# Patient Record
Sex: FEMALE | Race: WHITE | NOT HISPANIC OR LATINO | Employment: PART TIME | URBAN - METROPOLITAN AREA
[De-identification: names, ages, dates, MRNs, and addresses within clinical notes are randomized per-mention and may not be internally consistent; named-entity substitution may affect disease eponyms.]

---

## 2024-02-27 ENCOUNTER — OFFICE VISIT (OUTPATIENT)
Dept: DERMATOLOGY | Facility: CLINIC | Age: 59
End: 2024-02-27
Payer: COMMERCIAL

## 2024-02-27 DIAGNOSIS — L81.4 LENTIGO: ICD-10-CM

## 2024-02-27 DIAGNOSIS — L30.9 DERMATITIS: ICD-10-CM

## 2024-02-27 DIAGNOSIS — D48.5 NEOPLASM OF UNCERTAIN BEHAVIOR OF SKIN: Primary | ICD-10-CM

## 2024-02-27 DIAGNOSIS — D22.5 MELANOCYTIC NEVUS OF TRUNK: ICD-10-CM

## 2024-02-27 DIAGNOSIS — L82.1 SEBORRHEIC KERATOSIS: ICD-10-CM

## 2024-02-27 DIAGNOSIS — L57.0 ACTINIC KERATOSIS: ICD-10-CM

## 2024-02-27 DIAGNOSIS — Z87.2 HISTORY OF ACTINIC KERATOSES: ICD-10-CM

## 2024-02-27 DIAGNOSIS — L57.8 DIFFUSE PHOTODAMAGE OF SKIN: ICD-10-CM

## 2024-02-27 PROCEDURE — 88305 TISSUE EXAM BY PATHOLOGIST: CPT | Performed by: DERMATOLOGY

## 2024-02-27 PROCEDURE — 11306 SHAVE SKIN LESION 0.6-1.0 CM: CPT | Performed by: DERMATOLOGY

## 2024-02-27 PROCEDURE — 17004 DESTROY PREMAL LESIONS 15/>: CPT | Performed by: DERMATOLOGY

## 2024-02-27 PROCEDURE — 99204 OFFICE O/P NEW MOD 45 MIN: CPT | Performed by: DERMATOLOGY

## 2024-02-27 RX ORDER — TRIAMCINOLONE ACETONIDE 0.25 MG/G
CREAM TOPICAL
Qty: 80 G | Refills: 0 | Status: SHIPPED | OUTPATIENT
Start: 2024-02-27

## 2024-02-27 RX ORDER — FLUOROURACIL 50 MG/G
CREAM TOPICAL
Qty: 40 G | Refills: 2 | Status: SHIPPED | OUTPATIENT
Start: 2024-02-27

## 2024-02-27 ASSESSMENT — DERMATOLOGY PATIENT ASSESSMENT
ARE YOU AN ORGAN TRANSPLANT RECIPIENT: NO
ARE YOU TRYING TO GET PREGNANT: NO
DO YOU USE A TANNING BED: NO
DO YOU HAVE ANY NEW OR CHANGING LESIONS: NO
ARE YOU ON BIRTH CONTROL: NO
DO YOU USE SUNSCREEN: OCCASIONALLY
DO YOU HAVE IRREGULAR MENSTRUAL CYCLES: NO

## 2024-02-27 ASSESSMENT — DERMATOLOGY QUALITY OF LIFE (QOL) ASSESSMENT: ARE THERE EXCLUSIONS OR EXCEPTIONS FOR THE QUALITY OF LIFE ASSESSMENT: NO

## 2024-02-27 ASSESSMENT — ITCH NUMERIC RATING SCALE: HOW SEVERE IS YOUR ITCHING?: 0

## 2024-02-27 NOTE — PROGRESS NOTES
George Mathis is a 58 y.o. female who presents for the following: Skin Check.  She notes a bump on the top of her right hand, which has been present for several months and has increased in size.  She states it is scaly and scabby and does not heal and sometimes hurts when it gets bumped.  She also notes multiple red, rough spots on her face, which have not changed in any way and are asymptomatic, but they do not go away.      Review of Systems:  No other skin or systemic complaints other than what is documented elsewhere in the note.    The following portions of the chart were reviewed this encounter and updated as appropriate:  Allergies       Skin Cancer History  No skin cancer on file.    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 Wash hands thoroughly after each application., Disp: 40 g, Rfl: 2    triamcinolone (Kenalog) 0.025 % cream, Apply twice daily to affected areas (avoid the eyes) for 1-2 weeks following course of Efudex as directed, Disp: 80 g, Rfl: 0     Objective   Well appearing patient in no apparent distress; mood and affect are within normal limits.    A full examination was performed including scalp, face, eyes, ears, nose, lips, neck, chest, axillae, abdomen, back, bilateral upper extremities, and bilateral lower extremities. All findings within normal limits unless  otherwise noted below.    Assessment/Plan   1. Neoplasm of uncertain behavior of skin  Right Lateral Dorsal Hand  6 mm erythematous, scaly papule           Shave removal    Lesion length (cm):  0.6  Margin per side (cm):  0  Lesion diameter (cm):  0.6  Informed consent: discussed and consent obtained    Timeout: patient name, date of birth, surgical site, and procedure verified    Procedure prep:  Patient was prepped and draped  Anesthesia: the lesion was anesthetized in a standard fashion    Anesthetic:  1% lidocaine w/ epinephrine 1-100,000 local infiltration  Instrument used: flexible razor blade    Hemostasis achieved with: aluminum chloride    Outcome: patient tolerated procedure well    Post-procedure details: sterile dressing applied and wound care instructions given    Dressing type: bandage and petrolatum      Staff Communication: Dermatology Local Anesthesia: 1 % Lidocaine / Epinephrine - Amount:0.5ml    Specimen 1 - Dermatopathology- DERM LAB  Differential Diagnosis: HAK v SCCIS  Check Margins Yes/No?:    Comments:    Dermpath Lab: Routine Histopathology (formalin-fixed tissue)    2. Actinic keratosis (16)  Head - Anterior (Face) (16)  Scattered on the patient's face, there are multiple erythematous, gritty, scaly macules     Actinic Keratoses -scattered on face.  The pre-cancerous nature of these lesions and treatment options, including liquid nitrogen cryotherapy and field therapy, such as with a course of topical therapy with Efudex 5% cream, were discussed extensively with the patient today.  At this time, I recommend treatment with liquid nitrogen cryotherapy in the office today.  In addition, following cryotherapy, I recommend topical field therapy with a course of Efudex 5% cream, which the patient was instructed to apply twice daily to affected areas of her face for 2-3 weeks.  The risks, benefits, and side effects of this medication, including the redness and irritation expected with its use,  were discussed; the patient was instructed to discontinue use of the medication earlier if necessary due to excessive irritation.  I also prescribed topical steroid therapy with Triamcinolone 0.025% cream, which the patient may apply twice daily to affected areas for up to 7-10 days as needed for inflammation following the course of Efudex, which the patient plans to complete this April 2024 after she undergoes a scheduled surgery on her toe.  The risks, benefits, and side effects of these medications, including the redness, irritation, and discomfort associated with Efudex use as well as possible skin atrophy with overuse of topical steroids, were discussed at length.  I will have the patient return to my office in 4 to 6 months, pending the above biopsy result, for follow-up.  The patient expressed understanding, is in agreement with this plan, and wishes to proceed with cryotherapy today.    Destr of lesion - Head - Anterior (Face)  Complexity: simple    Destruction method: cryotherapy    Informed consent: discussed and consent obtained    Lesion destroyed using liquid nitrogen: Yes    Cryotherapy cycles:  1  Outcome: patient tolerated procedure well with no complications    Post-procedure details: wound care instructions given      fluorouracil (Efudex) 5 % cream - Head - Anterior (Face)  Apply to the affected nanci of the face twice daily for a total of 3 weeks. Wash hands thoroughly after each application.    triamcinolone (Kenalog) 0.025 % cream - Head - Anterior (Face)  Apply twice daily to affected areas (avoid the eyes) for 1-2 weeks following course of Efudex as directed    3. Melanocytic nevus of trunk  Scattered on the patient's face, neck, trunk, and extremities, there are multiple small, round- to oval-shaped, brown-pigmented and pink-colored, symmetric, uniform-appearing macules and dome-shaped papules    Clinically benign- to slightly atypical-appearing nevi - the clinically benign- to slightly  atypical-appearing nature of the patient's nevi was discussed with the patient today.  None of the patient's nevi meet threshold for biopsy today.  I emphasized the importance of performing monthly self-skin exams using the ABCDs of monitoring moles, which were reviewed with the patient today and an informational hand-out provided.  I also emphasized the importance of sun avoidance and sun protection with daily sunscreen use.  The patient expressed understanding and is in agreement with this plan.    4. Seborrheic keratosis  Scattered on the patient's face, neck, trunk, and extremities, there are multiple tan- to light brown-colored, hyperkeratotic, stuck-on appearing papules of varying size and shape    Seborrheic Keratoses - the benign nature of these lesions was discussed with the patient today and reassurance provided.  No treatment is medically indicated for these lesions at this time.    5. Lentigo  Photodistributed  Multiple tan- to light brown-colored, round- to oval-shaped, symmetric and uniform-appearing macules and small patches consistent with lentigines scattered in sun-exposed areas.    Solar Lentigines and photodamage.  The clinically benign-appearing nature of these lesions and their relation to chronic sun exposure were discussed with the patient today and reassurance provided.  None of these lesions meet threshold for biopsy today, and thus no treatment is medically indicated for these lesions at this time.  The signs and symptoms of skin cancer were reviewed and the patient was advised to practice sun protection and sun avoidance, use daily sunscreen, and perform regular self skin exams.  The patient was instructed to monitor these lesions for any changes, such as in size, shape, or color, or associated symptoms and to call our office to schedule a return visit for re-evaluation if any such changes or symptoms are noticed in the future.  The patient expressed understanding and is in agreement with  this plan.    6. History of actinic keratoses  There is evidence of photodamage in sun-exposed areas.    History of actinic keratoses and photodamage.  The signs and symptoms of skin cancer were reviewed and the patient was advised to practice sun protection and sun avoidance, use daily sunscreen, and perform regular self skin exams.  I will have the patient return to our office in 4 to 6 months, pending the above biopsy result, for routine follow-up and skin exam, and the patient was instructed to call our office should the patient notice any new, changing, symptomatic, or otherwise concerning skin lesions before then.  The patient expressed understanding and is in agreement with this plan.    7. Diffuse photodamage of skin  Photodistributed  Diffuse photodamage with actinic changes with telangiectasia and mottled pigmentation in sun-exposed areas.    Photodamage.  The signs and symptoms of skin cancer were reviewed and the patient was advised to practice sun protection and sun avoidance, use daily sunscreen, and perform regular self skin exams.  Sun protection was discussed, including avoiding the mid-day sun, wearing a sunscreen with SPF at least 50, and stressing the need for reapplication of sunscreen and applying more than they think they need.    8. Dermatitis    Related Medications  triamcinolone (Kenalog) 0.025 % cream  Apply twice daily to affected areas (avoid the eyes) for 1-2 weeks following course of Efudex as directed

## 2024-02-29 LAB
LABORATORY COMMENT REPORT: NORMAL
PATH REPORT.FINAL DX SPEC: NORMAL
PATH REPORT.GROSS SPEC: NORMAL
PATH REPORT.MICROSCOPIC SPEC OTHER STN: NORMAL
PATH REPORT.RELEVANT HX SPEC: NORMAL
PATH REPORT.TOTAL CANCER: NORMAL

## 2024-08-05 ASSESSMENT — DERMATOLOGY QUALITY OF LIFE (QOL) ASSESSMENT
RATE HOW EMOTIONALLY BOTHERED YOU ARE BY YOUR SKIN PROBLEM (FOR EXAMPLE, WORRY, EMBARRASSMENT, FRUSTRATION): 0 - NEVER BOTHERED
RATE HOW EMOTIONALLY BOTHERED YOU ARE BY YOUR SKIN PROBLEM (FOR EXAMPLE, WORRY, EMBARRASSMENT, FRUSTRATION): 0 - NEVER BOTHERED
RATE HOW BOTHERED YOU ARE BY EFFECTS OF YOUR SKIN PROBLEMS ON YOUR ACTIVITIES (EG, GOING OUT, ACCOMPLISHING WHAT YOU WANT, WORK ACTIVITIES OR YOUR RELATIONSHIPS WITH OTHERS): 0 - NEVER BOTHERED
RATE HOW BOTHERED YOU ARE BY SYMPTOMS OF YOUR SKIN PROBLEM (EG, ITCHING, STINGING BURNING, HURTING OR SKIN IRRITATION): 2
WHAT SINGLE SKIN CONDITION LISTED BELOW IS THE PATIENT ANSWERING THE QUALITY-OF-LIFE ASSESSMENT QUESTIONS ABOUT: ACTINIC KERATOSIS
WHAT SINGLE SKIN CONDITION LISTED BELOW IS THE PATIENT ANSWERING THE QUALITY-OF-LIFE ASSESSMENT QUESTIONS ABOUT: ACTINIC KERATOSIS
RATE HOW BOTHERED YOU ARE BY SYMPTOMS OF YOUR SKIN PROBLEM (EG, ITCHING, STINGING BURNING, HURTING OR SKIN IRRITATION): 2
DATE THE QUALITY-OF-LIFE ASSESSMENT WAS COMPLETED: 67057
RATE HOW BOTHERED YOU ARE BY EFFECTS OF YOUR SKIN PROBLEMS ON YOUR ACTIVITIES (EG, GOING OUT, ACCOMPLISHING WHAT YOU WANT, WORK ACTIVITIES OR YOUR RELATIONSHIPS WITH OTHERS): 0 - NEVER BOTHERED

## 2024-08-05 ASSESSMENT — PATIENT GLOBAL ASSESSMENT (PGA): WHAT IS THE PGA: PATIENT GLOBAL ASSESSMENT:  2 - MILD

## 2024-08-06 ENCOUNTER — APPOINTMENT (OUTPATIENT)
Dept: DERMATOLOGY | Facility: CLINIC | Age: 59
End: 2024-08-06
Payer: COMMERCIAL

## 2024-08-06 DIAGNOSIS — D48.5 NEOPLASM OF UNCERTAIN BEHAVIOR OF SKIN: Primary | ICD-10-CM

## 2024-08-06 DIAGNOSIS — L57.8 DIFFUSE PHOTODAMAGE OF SKIN: ICD-10-CM

## 2024-08-06 DIAGNOSIS — L82.0 INFLAMED SEBORRHEIC KERATOSIS: ICD-10-CM

## 2024-08-06 DIAGNOSIS — D22.5 MELANOCYTIC NEVUS OF TRUNK: ICD-10-CM

## 2024-08-06 DIAGNOSIS — L81.4 LENTIGO: ICD-10-CM

## 2024-08-06 DIAGNOSIS — Z87.2 HISTORY OF ACTINIC KERATOSES: ICD-10-CM

## 2024-08-06 DIAGNOSIS — L57.0 ACTINIC KERATOSIS: ICD-10-CM

## 2024-08-06 DIAGNOSIS — L82.1 SEBORRHEIC KERATOSIS: ICD-10-CM

## 2024-08-06 PROCEDURE — 11301 SHAVE SKIN LESION 0.6-1.0 CM: CPT | Performed by: DERMATOLOGY

## 2024-08-06 PROCEDURE — 17004 DESTROY PREMAL LESIONS 15/>: CPT | Performed by: DERMATOLOGY

## 2024-08-06 PROCEDURE — 99214 OFFICE O/P EST MOD 30 MIN: CPT | Performed by: DERMATOLOGY

## 2024-08-06 PROCEDURE — 17110 DESTRUCTION B9 LES UP TO 14: CPT | Performed by: DERMATOLOGY

## 2024-08-06 RX ORDER — TRETINOIN 0.25 MG/G
1 CREAM TOPICAL NIGHTLY
Qty: 45 G | Refills: 11 | Status: SHIPPED | OUTPATIENT
Start: 2024-08-06

## 2024-08-07 ENCOUNTER — APPOINTMENT (OUTPATIENT)
Dept: DERMATOLOGY | Facility: CLINIC | Age: 59
End: 2024-08-07
Payer: COMMERCIAL

## 2024-08-07 NOTE — PROGRESS NOTES
Subjective     Laurel Mathis is a 59 y.o. female who presents for the following: Skin Check.  She notes a raised, scaly bump on the left side of her back, which has been present for several months and has been itching recently.  It has not changed in any other way, including in size, shape, or color, and it does not hurt or bleed.  She denies any other new, changing, or concerning skin lesions since her last visit; no bleeding, itching, or burning lesions.      Review of Systems:  No other skin or systemic complaints other than what is documented elsewhere in the note.    The following portions of the chart were reviewed this encounter and updated as appropriate:       Skin Cancer History  No skin cancer on file.    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 Wash hands thoroughly after each application., Disp: 40 g, Rfl: 2    triamcinolone (Kenalog) 0.025 % cream, Apply twice daily to affected areas (avoid the eyes) for 1-2 weeks following course of Efudex as directed, Disp: 80 g, Rfl: 0    tretinoin (Retin-A) 0.025 % cream, Apply 1 Application topically once daily at bedtime. Start 1-2 nights per week 1-2 weeks, inc to every other night, then every night as tolerated, Disp: 45 g, Rfl: 11     Objective   Well appearing patient in no apparent distress; mood and affect  are within normal limits.    A full examination was performed including scalp, face, eyes, ears, nose, lips, neck, chest, axillae, abdomen, back, bilateral upper extremities, and bilateral lower extremities. All findings within normal limits unless otherwise noted below.    Assessment/Plan   1. Neoplasm of uncertain behavior of skin  Right Lower Back  5 mm dark brown pigmented, asymmetric macule with an asymmetric pigment network and irregular borders           Shave removal    Lesion length (cm):  0.5  Margin per side (cm):  0.2  Lesion diameter (cm):  0.9  Informed consent: discussed and consent obtained    Timeout: patient name, date of birth, surgical site, and procedure verified    Procedure prep:  Patient was prepped and draped  Anesthesia: the lesion was anesthetized in a standard fashion    Anesthetic:  1% lidocaine w/ epinephrine 1-100,000 local infiltration  Instrument used: flexible razor blade    Hemostasis achieved with: aluminum chloride    Outcome: patient tolerated procedure well    Post-procedure details: sterile dressing applied and wound care instructions given    Dressing type: bandage and petrolatum      Staff Communication: Dermatology Local Anesthesia: 1 % Lidocaine / Epinephrine - Amount:0.5ml    Specimen 1 - Dermatopathology- DERM LAB  Differential Diagnosis: DN  Check Margins Yes/No?:    Comments:    Dermpath Lab: Routine Histopathology (formalin-fixed tissue)    2. Actinic keratosis (16)  Head - Anterior (Face) (16)  Scattered on the patient's face, there are multiple small erythematous, gritty, scaly macules     Actinic Keratoses -significantly improved on face s/p recent course of topical field therapy with Efudex 5% cream completed in May 2024 with multiple small AKs scattered on face.  The pre-cancerous nature of these lesions and treatment options were discussed with the patient today.  At this time, I recommend treatment with liquid nitrogen cryotherapy.  The patient expressed  understanding, is in agreement with this plan, and wishes to proceed with cryotherapy today.    Destr of lesion - Head - Anterior (Face) (16)  Complexity: simple    Destruction method: cryotherapy    Informed consent: discussed and consent obtained    Lesion destroyed using liquid nitrogen: Yes    Cryotherapy cycles:  1  Outcome: patient tolerated procedure well with no complications    Post-procedure details: wound care instructions given      Related Medications  fluorouracil (Efudex) 5 % cream  Apply to the affected nanci of the face twice daily for a total of 3 weeks. Wash hands thoroughly after each application.    3. Inflamed seborrheic keratosis  Left Lower Back  On the patient's left mid back, there is a 1 cm erythematous and light brown-colored, hyperkeratotic, stuck-on appearing papule with a surrounding rim of erythema    Inflamed Seborrheic Keratosis -left mid back.  The benign nature of this lesion was discussed with the patient today and reassurance provided.  Given the history the patient provides of frequent irritation and associated symptoms as well as its inflamed appearance on exam today, I offered to treat this lesion with liquid nitrogen cryotherapy.  The patient expressed understanding, is in agreement with this plan, and wishes to proceed with cryotherapy today.    Destr of lesion - Left Lower Back  Complexity: simple    Destruction method: cryotherapy    Informed consent: discussed and consent obtained    Lesion destroyed using liquid nitrogen: Yes    Cryotherapy cycles:  2  Outcome: patient tolerated procedure well with no complications    Post-procedure details: wound care instructions given      4. Melanocytic nevus of trunk  Scattered on the patient's face, neck, trunk, and extremities, there are multiple small, round- to oval-shaped, brown-pigmented and pink-colored, symmetric, uniform-appearing macules and dome-shaped papules    Clinically benign- to slightly atypical-appearing nevi -  the clinically benign- to slightly atypical-appearing nature of the remainder of the patient's nevi was discussed with the patient today.  None of the patient's nevi, with the exception of the one noted above, meet threshold for biopsy today.  I emphasized the importance of performing monthly self-skin exams using the ABCDs of monitoring moles, which were reviewed with the patient today and an informational hand-out provided.  I also emphasized the importance of sun avoidance and sun protection with daily sunscreen use.    5. Seborrheic keratosis  Scattered on the patient's face, neck, trunk, and extremities, there are multiple tan- to light brown-colored, hyperkeratotic, stuck-on appearing papules of varying size and shape    Seborrheic Keratoses - the benign nature of these lesions was discussed with the patient today and reassurance provided.  No treatment is medically indicated for the noninflamed SKs at this time.    6. Lentigo  Photodistributed  Multiple tan- to light brown-colored, round- to oval-shaped, symmetric and uniform-appearing macules and small patches consistent with lentigines scattered in sun-exposed areas.    Solar Lentigines and photodamage.  The clinically benign-appearing nature of these lesions and their relation to chronic sun exposure were discussed with the patient today and reassurance provided.  None of these lesions meet threshold for biopsy today, and thus no treatment is medically indicated for these lesions at this time.  The signs and symptoms of skin cancer were reviewed and the patient was advised to practice sun protection and sun avoidance, use daily sunscreen, and perform regular self skin exams.  The patient was instructed to monitor these lesions for any changes, such as in size, shape, or color, or associated symptoms and to call our office to schedule a return visit for re-evaluation if any such changes or symptoms are noticed in the future.  The patient expressed  understanding and is in agreement with this plan.    7. History of actinic keratoses  There is evidence of photodamage in sun-exposed areas.    History of actinic keratoses and photodamage.  The signs and symptoms of skin cancer were reviewed and the patient was advised to practice sun protection and sun avoidance, use daily sunscreen, and perform regular self skin exams.  I will have the patient return to our office in 4 to 6 months, pending the above biopsy result, for routine follow-up and skin exam, and the patient was instructed to call our office should the patient notice any new, changing, symptomatic, or otherwise concerning skin lesions before then.  The patient expressed understanding and is in agreement with this plan.    8. Diffuse photodamage of skin  Photodistributed  Diffuse photodamage with actinic changes with telangiectasia and mottled pigmentation in sun-exposed areas.    Photodamage.  The signs and symptoms of skin cancer were reviewed and the patient was advised to practice sun protection and sun avoidance, use daily sunscreen, and perform regular self skin exams.  In addition, the patient wished to discuss possible medical treatment for photoaging.  Thus, at this time, I recommend the patient begin topical retinoid therapy with Tretinoin 0.025% cream at night.  The risks, benefits, and side effects of this medication, including the redness, dryness, and irritation expected with its use, were discussed; the patient was instructed to begin use of Tretinoin 1-2 nights per week and increase to every other night, then every night as tolerated without excessive dryness and irritation.  The patient was also informed this medication will likely be considered cosmetic and thus may not be covered by their insurance company and require out-of-pocket payment.  The patient expressed understanding and is in agreement with this plan.    tretinoin (Retin-A) 0.025 % cream - Photodistributed  Apply 1 Application  topically once daily at bedtime. Start 1-2 nights per week 1-2 weeks, inc to every other night, then every night as tolerated

## 2024-08-08 ENCOUNTER — TELEPHONE (OUTPATIENT)
Dept: DERMATOLOGY | Facility: CLINIC | Age: 59
End: 2024-08-08
Payer: COMMERCIAL

## 2024-09-13 ENCOUNTER — OFFICE VISIT (OUTPATIENT)
Dept: OBSTETRICS AND GYNECOLOGY | Facility: CLINIC | Age: 59
End: 2024-09-13
Payer: COMMERCIAL

## 2024-09-13 VITALS
DIASTOLIC BLOOD PRESSURE: 82 MMHG | SYSTOLIC BLOOD PRESSURE: 133 MMHG | HEIGHT: 68 IN | WEIGHT: 172 LBS | BODY MASS INDEX: 26.07 KG/M2

## 2024-09-13 DIAGNOSIS — M62.89 PFD (PELVIC FLOOR DYSFUNCTION): Primary | ICD-10-CM

## 2024-09-13 DIAGNOSIS — Z00.00 HEALTHCARE MAINTENANCE: ICD-10-CM

## 2024-09-13 DIAGNOSIS — Z12.31 ENCOUNTER FOR SCREENING MAMMOGRAM FOR BREAST CANCER: ICD-10-CM

## 2024-09-13 LAB
POC BLOOD, URINE: NEGATIVE
POC GLUCOSE, URINE: NEGATIVE MG/DL
POC KETONES, URINE: NEGATIVE MG/DL
POC LEUKOCYTES, URINE: NEGATIVE
POC NITRITE,URINE: NEGATIVE
POC PROTEIN, URINE: NEGATIVE MG/DL

## 2024-09-13 PROCEDURE — 99386 PREV VISIT NEW AGE 40-64: CPT | Performed by: OBSTETRICS & GYNECOLOGY

## 2024-09-13 PROCEDURE — 81003 URINALYSIS AUTO W/O SCOPE: CPT | Mod: QW | Performed by: OBSTETRICS & GYNECOLOGY

## 2024-09-13 PROCEDURE — 3008F BODY MASS INDEX DOCD: CPT | Performed by: OBSTETRICS & GYNECOLOGY

## 2024-09-13 ASSESSMENT — ENCOUNTER SYMPTOMS
NEUROLOGICAL NEGATIVE: 0
EYES NEGATIVE: 0
CARDIOVASCULAR NEGATIVE: 0
HEMATOLOGIC/LYMPHATIC NEGATIVE: 0
RESPIRATORY NEGATIVE: 0
ALLERGIC/IMMUNOLOGIC NEGATIVE: 0
ENDOCRINE NEGATIVE: 0
OCCASIONAL FEELINGS OF UNSTEADINESS: 0
CONSTITUTIONAL NEGATIVE: 0
DEPRESSION: 0
GASTROINTESTINAL NEGATIVE: 0
PSYCHIATRIC NEGATIVE: 0
LOSS OF SENSATION IN FEET: 0
MUSCULOSKELETAL NEGATIVE: 0

## 2024-09-13 ASSESSMENT — PAIN SCALES - GENERAL: PAINLEVEL: 0-NO PAIN

## 2024-09-13 NOTE — PROGRESS NOTES
Subjective   Patient ID: Laurel Mathis is a 59 y.o. female who presents for New Patient Visit (New patient visit/annual last pap 21 wnl last mammogram 21 benign).  NATHAN Barragan is a 59-year-old female  3 para 3 known to the practice here for her annual exam and to reestablish care after a 3-year absence.  She did have a supracervical hysterectomy and is still getting Pap test.  She also is complaining of some dyspareunia deep.  Does have some urinary urgency but no leakage.  She did have  Surgery to her foot which required narcotic pain medicine and feels was close the problems with her urine started after that episode.  Also increased pelvic pain after that episode.  Review of Systems   Genitourinary:  Positive for pelvic pain.       Objective   Physical Exam  Thyroid: No thyroid megaly    Cardiovascular: Regular rate and rhythm    Lungs: Clear to auscultation    Breasts: No skin changes no masses palpated    Abdomen: Soft nontender bowel sounds positive no masses palpated    Extremities nontender no edema    Pelvic exam: External genitalia [place]'s urethra and [place]'s are normal.  Vaginal exam shows no lesions or discharge.  Pelvic bimanual exam reveals no masses or tenderness.  Pain on the right pelvic sidewall.  Good vaginal elasticity.  No pelvic masses.  Assessment/Plan   Pelvic floor dysfunction right greater than left.  Causing dyspareunia.  Referred to physical therapy  Pap smear performed and mammogram ordered      Follow-up in 1 year or as needed         Cliff Parra MD 24 11:00 AM

## 2024-09-20 ENCOUNTER — HOSPITAL ENCOUNTER (OUTPATIENT)
Dept: RADIOLOGY | Facility: CLINIC | Age: 59
Discharge: HOME | End: 2024-09-20
Payer: COMMERCIAL

## 2024-09-20 VITALS — WEIGHT: 169 LBS | BODY MASS INDEX: 25.61 KG/M2 | HEIGHT: 68 IN

## 2024-09-20 DIAGNOSIS — Z12.31 ENCOUNTER FOR SCREENING MAMMOGRAM FOR BREAST CANCER: ICD-10-CM

## 2024-09-20 PROCEDURE — 77067 SCR MAMMO BI INCL CAD: CPT

## 2024-09-24 LAB
CYTOLOGY CMNT CVX/VAG CYTO-IMP: NORMAL
HPV HR 12 DNA GENITAL QL NAA+PROBE: NEGATIVE
HPV HR GENOTYPES PNL CVX NAA+PROBE: NEGATIVE
HPV16 DNA SPEC QL NAA+PROBE: NEGATIVE
HPV18 DNA SPEC QL NAA+PROBE: NEGATIVE
LAB AP HPV GENOTYPE QUESTION: YES
LAB AP HPV HR: NORMAL
LABORATORY COMMENT REPORT: NORMAL
PATH REPORT.TOTAL CANCER: NORMAL

## 2024-10-01 ENCOUNTER — APPOINTMENT (OUTPATIENT)
Dept: PHYSICAL THERAPY | Facility: CLINIC | Age: 59
End: 2024-10-01
Payer: COMMERCIAL

## 2024-10-16 ENCOUNTER — APPOINTMENT (OUTPATIENT)
Dept: PHYSICAL THERAPY | Facility: CLINIC | Age: 59
End: 2024-10-16
Payer: COMMERCIAL

## 2024-11-27 ENCOUNTER — APPOINTMENT (OUTPATIENT)
Dept: DERMATOLOGY | Facility: CLINIC | Age: 59
End: 2024-11-27
Payer: COMMERCIAL

## 2025-02-04 ENCOUNTER — APPOINTMENT (OUTPATIENT)
Dept: DERMATOLOGY | Facility: CLINIC | Age: 60
End: 2025-02-04
Payer: COMMERCIAL